# Patient Record
Sex: FEMALE | Race: WHITE | Employment: FULL TIME | URBAN - METROPOLITAN AREA
[De-identification: names, ages, dates, MRNs, and addresses within clinical notes are randomized per-mention and may not be internally consistent; named-entity substitution may affect disease eponyms.]

---

## 2022-10-14 ENCOUNTER — OFFICE VISIT (OUTPATIENT)
Dept: FAMILY MEDICINE | Facility: CLINIC | Age: 52
End: 2022-10-14

## 2022-10-14 VITALS
WEIGHT: 163 LBS | DIASTOLIC BLOOD PRESSURE: 78 MMHG | SYSTOLIC BLOOD PRESSURE: 118 MMHG | HEIGHT: 62 IN | TEMPERATURE: 98.1 F | HEART RATE: 90 BPM | BODY MASS INDEX: 30 KG/M2 | OXYGEN SATURATION: 98 %

## 2022-10-14 DIAGNOSIS — Z01.818 PREOP GENERAL PHYSICAL EXAM: Primary | ICD-10-CM

## 2022-10-14 DIAGNOSIS — E66.811 OBESITY (BMI 30.0-34.9): ICD-10-CM

## 2022-10-14 DIAGNOSIS — D64.9 LOW HEMOGLOBIN: ICD-10-CM

## 2022-10-14 DIAGNOSIS — M17.11 PRIMARY OSTEOARTHRITIS OF RIGHT KNEE: ICD-10-CM

## 2022-10-14 DIAGNOSIS — D50.9 IRON DEFICIENCY ANEMIA, UNSPECIFIED IRON DEFICIENCY ANEMIA TYPE: ICD-10-CM

## 2022-10-14 DIAGNOSIS — Z83.49 FAMILY HISTORY OF THYROID DISEASE: ICD-10-CM

## 2022-10-14 PROBLEM — Z76.89 HEALTH CARE HOME: Status: ACTIVE | Noted: 2022-10-14

## 2022-10-14 PROBLEM — Z71.89 ACP (ADVANCE CARE PLANNING): Status: ACTIVE | Noted: 2022-10-14

## 2022-10-14 LAB
ERYTHROCYTE [DISTWIDTH] IN BLOOD BY AUTOMATED COUNT: 16.3 %
HBA1C MFR BLD: 5.5 % (ref 4–7)
HCT VFR BLD AUTO: 31.8 % (ref 35–47)
HEMOGLOBIN: 10.3 G/DL (ref 11.7–15.7)
MCH RBC QN AUTO: 24.5 PG (ref 26–33)
MCHC RBC AUTO-ENTMCNC: 32.4 G/DL (ref 31–36)
MCV RBC AUTO: 75.6 FL (ref 78–100)
PLATELET COUNT - QUEST: 312 10^9/L (ref 150–375)
RBC # BLD AUTO: 4.21 10*12/L (ref 3.8–5.2)
WBC # BLD AUTO: 11 10*9/L (ref 4–11)

## 2022-10-14 PROCEDURE — 83036 HEMOGLOBIN GLYCOSYLATED A1C: CPT | Performed by: PHYSICIAN ASSISTANT

## 2022-10-14 PROCEDURE — 84443 ASSAY THYROID STIM HORMONE: CPT | Mod: 90 | Performed by: PHYSICIAN ASSISTANT

## 2022-10-14 PROCEDURE — 99203 OFFICE O/P NEW LOW 30 MIN: CPT | Performed by: PHYSICIAN ASSISTANT

## 2022-10-14 PROCEDURE — 85027 COMPLETE CBC AUTOMATED: CPT | Performed by: PHYSICIAN ASSISTANT

## 2022-10-14 PROCEDURE — 83540 ASSAY OF IRON: CPT | Mod: 90 | Performed by: PHYSICIAN ASSISTANT

## 2022-10-14 PROCEDURE — 82728 ASSAY OF FERRITIN: CPT | Mod: 90 | Performed by: PHYSICIAN ASSISTANT

## 2022-10-14 PROCEDURE — 36415 COLL VENOUS BLD VENIPUNCTURE: CPT | Performed by: PHYSICIAN ASSISTANT

## 2022-10-14 PROCEDURE — 83550 IRON BINDING TEST: CPT | Mod: 90 | Performed by: PHYSICIAN ASSISTANT

## 2022-10-14 RX ORDER — IBUPROFEN 400 MG/1
400 TABLET, FILM COATED ORAL EVERY 6 HOURS PRN
COMMUNITY

## 2022-10-14 NOTE — NURSING NOTE
Chief Complaint   Patient presents with     Pre-Op Exam     Right knee surgery 11/7 at [facility]     Establish Care         Pre-visit Screening:  Immunizations:  up to date  Colonoscopy:  NA  Mammogram: NA  Asthma Action Test/Plan:  NA  PHQ9:  NA  GAD7:  NA  Questioned patient about current smoking habits Pt. has never smoked.  Ok to leave detailed message on voice mail for today's visit only Yes, phone # [phone]

## 2022-10-14 NOTE — PROGRESS NOTES
[facility]  [address] [place] [address]  [address]  [facility] [address]  Phone: [phone]  Fax: [phone]  Primary Provider: Mary Herman  Pre-op Performing Provider: MARY HEMRAN      PREOPERATIVE EVALUATION:  Today's date: 10/14/2022    Howard Holder is a 51 year old female who presents for a preoperative evaluation.    Surgical Information:  Surgery/Procedure: right knee surgery  Surgery Location: [facility]  Surgeon: Dr. Murillo  Surgery Date: 11/7/2022  Time of Surgery: TBD  Where patient plans to recover: Other: overnight stay [facility]  Fax number for surgical facility: [phone]    Type of Anesthesia Anticipated: to be determined    Assessment & Plan     The proposed surgical procedure is considered INTERMEDIATE risk.    1. Preop general physical exam    2. Primary osteoarthritis of right knee    3. Obesity (BMI 30.0-34.9)    4. Family history of thyroid disease    5. Low hemoglobin    6. Iron deficiency anemia, unspecified iron deficiency anemia type             Risks and Recommendations:  The patient has the following additional risks and recommendations for perioperative complications:  Anemia/Bleeding/Clotting:    - Anemia and does not require treatment prior to surgery. Monitor hemoglobin postoperatively    Medication Instructions:  Patient is on no chronic medications    RECOMMENDATION:  APPROVAL GIVEN to proceed with proposed procedure, without further diagnostic evaluation.        Pt should RTC after surgery to evaluate cause of iron deficiency anemia. Has been advised to start OTC iron supplementation.          Subjective     HPI related to upcoming procedure: 2 years ago injury to the right knee    1. No - Have you ever had a heart attack or stroke?  2. No - Have you ever had surgery on your heart or blood vessels, such as a stent, coronary (heart) bypass, or surgery on an artery in the head, neck, heart, or legs?  3. No - Do you have chest  pain when you are physically active?  4. No - Do you have a history of heart failure?  5. No - Do you currently have a cold, bronchitis, or symptoms of other respiratory (head and chest) infections?  6. No - Do you have a cough, shortness of breath, or wheezing?  7. No - Do you or anyone in your family have a history of blood clots?  8. No - Do you or anyone in your family have a serious bleeding problem, such as long-lasting bleeding after surgeries or cuts?  9.YES - Have you ever had anemia or been told to take iron pills? With pregnancy  10. No - Have you had any abnormal blood loss such as black, tarry or bloody stools, or abnormal vaginal bleeding?  11. No - Have you ever had a blood transfusion?  12. Yes - Are you willing to have a blood transfusion if it is medically needed before, during, or after your surgery?  13. No - Have you or anyone in your family ever had problems with anesthesia (sedation for surgery)?  14. No - Do you have sleep apnea, excessive snoring, or daytime drowsiness?   15. No - Do you have any artifical heart valves or other implanted medical devices, such as a pacemaker, defibrillator, or continuous glucose monitor?  16. No - Do you have any artifical joints?  17. No - Are you allergic to latex?  18. No - Is there any chance that you may be pregnant?      Health Care Directive:  Patient does not have a Health Care Directive or Living Will: Discussed advance care planning with patient; information given to patient to review.    Preoperative Review of :   reviewed - no record of controlled substances prescribed.      Status of Chronic Conditions:  See problem list for active medical problems.  Problems all longstanding and stable, except as noted/documented.  See ROS for pertinent symptoms related to these conditions.      Review of Systems  Constitutional, neuro, ENT, endocrine, pulmonary, cardiac, gastrointestinal, genitourinary, musculoskeletal, integument and psychiatric systems  "are negative, except as otherwise noted.    Patient Active Problem List    Diagnosis Date Noted     Health Care Home 10/14/2022     Priority: Low     ACP (advance care planning) 10/14/2022     Priority: Low      Past Medical History:   Diagnosis Date     Allergic state      Past Surgical History:   Procedure Laterality Date      SECTION      , ,      Current Outpatient Medications   Medication Sig Dispense Refill     ibuprofen (ADVIL/MOTRIN) 400 MG tablet Take 400 mg by mouth every 6 hours as needed for moderate pain         No Known Allergies     Social History     Tobacco Use     Smoking status: Never     Smokeless tobacco: Never   Substance Use Topics     Alcohol use: Not Currently     Comment: rare     Family History   Problem Relation Age of Onset     Hyperlipidemia Mother      Other - See Comments Mother      Carotid Endarterectomy Mother      Stomach Cancer Father 55     Thyroid Disease Sister      History   Drug Use Not on file         Objective     /78 (BP Location: Left arm, Patient Position: Sitting, Cuff Size: Adult Large)   Pulse 90   Temp 98.1  F (36.7  C) (Temporal)   Ht 1.562 m (5' 1.5\")   Wt 73.9 kg (163 lb)   SpO2 98%   BMI 30.30 kg/m      Physical Exam    GENERAL APPEARANCE: healthy, alert and no distress     EYES: EOMI, PERRL     HENT: ear canals and TM's normal and nose and mouth without ulcers or lesions     NECK: no adenopathy, no asymmetry, masses, or scars and thyroid normal to palpation     RESP: lungs clear to auscultation - no rales, rhonchi or wheezes     CV: regular rates and rhythm, normal S1 S2, no S3 or S4 and no murmur, click or rub     ABDOMEN:  soft, nontender, no HSM or masses and bowel sounds normal     MS: extremities normal- no gross deformities noted, no evidence of inflammation in joints, FROM in all extremities.     SKIN: no suspicious lesions or rashes     NEURO: Normal strength and tone, sensory exam grossly normal, mentation intact and " speech normal     PSYCH: mentation appears normal. and affect normal/bright     LYMPHATICS: No cervical adenopathy    No results for input(s): HGB, PLT, INR, NA, POTASSIUM, CR, A1C in the last 71794 hours.     Diagnostics:    Component      Latest Ref Rng & Units 10/14/2022   WBC      4.0 - 11 10*9/L 11.0   RBC Count      3.8 - 5.2 10*12/L 4.21   Hemoglobin      11.7 - 15.7 g/dL 10.3 (A)   Hematocrit      35.0 - 47.0 % 31.8 (A)   MCV      78 - 100 fL 75.6 (A)   MCH      26 - 33 pg 24.5 (A)   MCHC      31 - 36 g/dL 32.4   RDW      % 16.3   Platelet Count      150 - 375 10:9/L 312   Iron      45 - 160 mcg/dL 24 (L)   TIBC      250 - 450 mcg/dL (calc) 417   % Saturation      16 - 45 % (calc) 6 (L)   TSH      mIU/L 3.57   Hemoglobin A1C      4.0 - 7.0 % 5.5   Ferritin      16 - 232 ng/mL 2 (L)       No EKG required, no history of coronary heart disease, significant arrhythmia, peripheral arterial disease or other structural heart disease.    Revised Cardiac Risk Index (RCRI):  The patient has the following serious cardiovascular risks for perioperative complications:   - No serious cardiac risks = 0 points     RCRI Interpretation: 0 points: Class I (very low risk - 0.4% complication rate)           Signed Electronically by: JENISE Carlton  Copy of this evaluation report is provided to requesting physician.

## 2022-10-15 LAB
% SATURATION - QUEST: 6 % (CALC) (ref 16–45)
FERRITIN SERPL-MCNC: 2 NG/ML (ref 16–232)
IRON: 24 MCG/DL (ref 45–160)
TIBC - QUEST: 417 MCG/DL (CALC) (ref 250–450)
TSH SERPL-ACNC: 3.57 MIU/L

## 2022-10-25 ENCOUNTER — TELEPHONE (OUTPATIENT)
Dept: FAMILY MEDICINE | Facility: CLINIC | Age: 52
End: 2022-10-25

## 2022-10-25 NOTE — TELEPHONE ENCOUNTER
Maikin K Farrington called the clinic support line with the following:    States that her Ortho wanted her to have her HGB tested again to see if it is improving. Advised that she would need an order.     She will call over to have them fax it. Can call her when we get order     [phone]

## 2022-11-02 DIAGNOSIS — D50.9 IRON DEFICIENCY ANEMIA, UNSPECIFIED IRON DEFICIENCY ANEMIA TYPE: Primary | ICD-10-CM

## 2022-11-02 LAB
% GRANULOCYTES: 71.6 %
HCT VFR BLD AUTO: 37.2 % (ref 35–47)
HEMOGLOBIN: 11.9 G/DL (ref 11.7–15.7)
LYMPHOCYTES NFR BLD AUTO: 22.4 %
MCH RBC QN AUTO: 25.2 PG (ref 26–33)
MCHC RBC AUTO-ENTMCNC: 32 G/DL (ref 31–36)
MCV RBC AUTO: 78.6 FL (ref 78–100)
MONOCYTES NFR BLD AUTO: 6 %
PLATELET COUNT - QUEST: 318 10^9/L (ref 150–375)
RBC # BLD AUTO: 4.73 10*12/L (ref 3.8–5.2)
WBC # BLD AUTO: 9.9 10*9/L (ref 4–11)

## 2022-11-02 PROCEDURE — 36415 COLL VENOUS BLD VENIPUNCTURE: CPT | Performed by: PHYSICIAN ASSISTANT

## 2022-11-02 PROCEDURE — 85025 COMPLETE CBC W/AUTO DIFF WBC: CPT | Performed by: PHYSICIAN ASSISTANT

## 2022-11-20 ENCOUNTER — HEALTH MAINTENANCE LETTER (OUTPATIENT)
Age: 52
End: 2022-11-20

## 2023-07-20 ENCOUNTER — OFFICE VISIT (OUTPATIENT)
Dept: FAMILY MEDICINE | Facility: CLINIC | Age: 53
End: 2023-07-20

## 2023-07-20 VITALS
TEMPERATURE: 98.2 F | WEIGHT: 161 LBS | OXYGEN SATURATION: 99 % | HEART RATE: 99 BPM | HEIGHT: 62 IN | SYSTOLIC BLOOD PRESSURE: 118 MMHG | DIASTOLIC BLOOD PRESSURE: 68 MMHG | BODY MASS INDEX: 29.63 KG/M2

## 2023-07-20 DIAGNOSIS — Z86.2 HISTORY OF IRON DEFICIENCY ANEMIA: ICD-10-CM

## 2023-07-20 DIAGNOSIS — Z12.11 SCREENING FOR COLON CANCER: ICD-10-CM

## 2023-07-20 DIAGNOSIS — Z11.51 SCREENING FOR HUMAN PAPILLOMAVIRUS: ICD-10-CM

## 2023-07-20 DIAGNOSIS — Z12.31 ENCOUNTER FOR SCREENING MAMMOGRAM FOR BREAST CANCER: ICD-10-CM

## 2023-07-20 DIAGNOSIS — Z01.419 ENCOUNTER FOR GYNECOLOGICAL EXAMINATION WITHOUT ABNORMAL FINDING: Primary | ICD-10-CM

## 2023-07-20 DIAGNOSIS — Z13.228 SCREENING FOR METABOLIC DISORDER: ICD-10-CM

## 2023-07-20 DIAGNOSIS — Z13.220 SCREENING FOR LIPID DISORDERS: ICD-10-CM

## 2023-07-20 PROCEDURE — 88142 CYTOPATH C/V THIN LAYER: CPT | Mod: 90 | Performed by: PHYSICIAN ASSISTANT

## 2023-07-20 PROCEDURE — 87624 HPV HI-RISK TYP POOLED RSLT: CPT | Mod: 90 | Performed by: PHYSICIAN ASSISTANT

## 2023-07-20 PROCEDURE — 99396 PREV VISIT EST AGE 40-64: CPT | Performed by: PHYSICIAN ASSISTANT

## 2023-07-20 RX ORDER — CETIRIZINE HYDROCHLORIDE 10 MG/1
10 TABLET ORAL DAILY
COMMUNITY

## 2023-07-20 NOTE — NURSING NOTE
Chief Complaint   Patient presents with     Physical     Fasting cpx         Pre-visit Screening:  Immunizations:  up to date  Colonoscopy:  is due and ordered today- prefers Cologuard  Mammogram: is due and ordered today  Asthma Action Test/Plan:  NA  PHQ9:  NA phq2 done  GAD7:  NA  Questioned patient about current smoking habits Pt. has never smoked.  Ok to leave detailed message on voice mail for today's visit only Yes, phone # [phone]

## 2023-07-20 NOTE — PROGRESS NOTES
Chief Complaint:  Physical Exam    SUBJECTIVE:   Howard Holder is a 52 year old female presents for routine health maintenance.    Current concerns: None. Fasting labs     Menses are regular    Patient's last menstrual period was 07/05/2023.    Was last Pap smear normal: Yes  Due for mammogram:  Yes. Needs first mammo    Body mass index is 29.93 kg/m .    Present exercise habits:  3-5 times/week  Present dietary habits:  eats regular meals and follows a balanced nutrition diet    Calcium intake: 1-2 servings daily.    Vit D intake: is not taking supplement    Is the patient a smoker? No  If yes, smoking cessation advised and counseling provided.     Cardiovascular risk factors: none    Over the past few weeks, have you felt down or depressed? Little interest or pleasure in doing things? No concerns    If in a relationship are there any Domestic violence concern: No    Last dental appointment:  last year  Last optical appointment:  this year    Was the patient born between [birth year] and has not had Hep C testing?  Has not been tested, declines testing    I have reviewed the following histories: Past Medical History, Past Surgical History, Social History, Family History, Problem List, Medication List and Allergies    Past Medical History:   Diagnosis Date     Allergic state      Family History   Problem Relation Age of Onset     Hyperlipidemia Mother      Other - See Comments Mother      Carotid Endarterectomy Mother      Stomach Cancer Father 55     Thyroid Disease Sister      Colon Cancer No family hx of      Social History     Socioeconomic History     Marital status:      Spouse name: Not on file     Number of children: Not on file     Years of education: Not on file     Highest education level: Not on file   Occupational History     Not on file   Tobacco Use     Smoking status: Never     Passive exposure: Past     Smokeless tobacco: Never   Substance and Sexual Activity     Alcohol use: Not Currently  "    Comment: rare- 2 drinks per year     Drug use: Never     Sexual activity: Yes     Partners: Male     Comment:    Other Topics Concern     Not on file   Social History Narrative     Not on file     Social Determinants of Health     Financial Resource Strain: Not on file   Food Insecurity: Not on file   Transportation Needs: Not on file   Physical Activity: Not on file   Stress: Not on file   Social Connections: Not on file   Intimate Partner Violence: Not on file   Housing Stability: Not on file     Past Surgical History:   Procedure Laterality Date      SECTION      , ,      REPLACEMENT TOTAL KNEE Right 2022    TCO     TUBAL LIGATION         ROS:  E/M: NEGATIVE for ear, nose, mouth and throat problems  R: NEGATIVE for significant/chronic cough or SOB  CV: NEGATIVE for chest pain or palpitations  GI: NEGATIVE for abdominal pain, chronic diarrhea or constipation  :  NEGATIVE for dysuria, hematuria or vaginal discharge. No sexual health concerns.       Current Outpatient Medications   Medication     cetirizine (ZYRTEC) 10 MG tablet     ibuprofen (ADVIL/MOTRIN) 400 MG tablet     No current facility-administered medications for this visit.       Patient Active Problem List    Diagnosis Date Noted     Health Care Home 10/14/2022     Priority: Low     ACP (advance care planning) 10/14/2022     Priority: Low         OBJECTIVE:  /68 (BP Location: Left arm, Patient Position: Sitting, Cuff Size: Adult Large)   Pulse 99   Temp 98.2  F (36.8  C) (Temporal)   Ht 1.562 m (5' 1.5\")   Wt 73 kg (161 lb)   LMP 2023   SpO2 99%   BMI 29.93 kg/m      General: 52 year old female who appears her stated age. Vital signs noted.  Head: Normocephalic  Eyes: pupils equal round reactive to light and accomodation, extra ocular movements intact  Ears: external canals and TMs free of abnormalities  Nose: patent, without mucosal abnormalities  Mouth and throat: without erythema or lesions of " "the mucosa  Neck: supple, without adenopathy or thyromegaly  Lungs: clear to auscultation, no wheezing or crackles  Breasts: skin without rash, no dominant mass, no nipple discharge, or axillary adenopathy  Cv: regular rate and rhythm, normal s1 and s2 without murmur or click  Abd: soft, non-tender, no masses, no hepatomegaly or splenomegaly.   (female): normal female external genitalia, normal urethral meatus, vaginal mucosa, normal cervix/adnexa/uterus without masses or discharge  Ms: normal muscle tone & symmetry  Skin: clear to inspection and with no palpable abnormalities.  Neuro: sensation and motor function grossly intact; cranial nerves without obvious abnormalities.    ASSESSMENT/PLAN:    Encounter for gynecological examination without abnormal finding  Howard is doing well today. Not fasting, but agrees to return in 2 weeks for lab only. Will also check iron panel at that time given her history. Updating Pap today. Ordered cologuard and first mammo.     Screening for metabolic disorde  - VENOUS COLLECTION; Standing  - Comprehensive Metobolic Panel ([facility]); Standing    Screening for lipid disorders  - VENOUS COLLECTION; Standing  - Lipid Panel ([facility]); Standing    History of iron deficiency anemia  - Hemogram Platelet ([facility]); Standing  - FERRITIN ([facility]); Standing  - IRON AND IRON BINDING CAPACITY ([facility]); Standing    Screening for human papillomavirus  - ThinPrep Pap and HPV (mRNa E6/E7) ([facility])    Screening for colon cancer  Offered home stool test called Cologuard. You may want to check with your insurance to see if this is covered, otherwise, let me know and I will order it. If the Cologuard comes back negative/normal it is considered good for 3 years. If it comes back positive/abnormal, they recommend a colonoscopy. They estimate 13/100 people will have a positive test. Just to warn you, if you test positive and need a colonoscopy, we would have to order the colonoscopy as a \"diagnostic\" instead of a " "\"screening\" colonoscopy as they are doing it to determine why the Cologuard result was abnormal. This means the cost of the colonoscopy would be coming out of your insurance deductible instead of your preventative health that is typically covered, and would likely cost ~$2000. Pt would like to do this option.  - COLOGUARD([employer])    Encounter for screening mammogram for breast cancer  - MA Screening Bilateral w/ Julio; Future  - Radiology Referral; Future     reports that she has never smoked. She has been exposed to tobacco smoke. She has never used smokeless tobacco.    Estimated body mass index is 29.93 kg/m  as calculated from the following:    Height as of this encounter: 1.562 m (5' 1.5\").    Weight as of this encounter: 73 kg (161 lb).  Weight management plan: Discussed healthy diet and exercise guidelines    Labs pending:      Fasting glucose      Fasting lipids  Meds Suggested:      Vitamin D       Calcium  Tests Recommended:      Regular Dental Examinations        Eye exam        Mammogram yearly  Behavior Modifications:       Cardiovascular exercise 3 times per week--enough to get your Target Heart rate  Other recommendations:     BMI noted and discussed      Regular breast exam     Encouraged My Chart    Counseling Resources:  ATP IV Guidelines  Pooled Cohorts Equation Calculator  Breast Cancer Risk Calculator  FRAX Risk Assessment  ICSI Preventive Guidelines  Dietary Guidelines for Americans, 2010  [employer]'s MyPlate            Libia Whalen PA-C  7/20/2023    "

## 2023-07-24 LAB
CLINICAL HISTORY - QUEST: NORMAL
COMMENT - QUEST: NORMAL
CYTOTECHNOLOGIST - QUEST: NORMAL
DESCRIPTIVE DIAGNOSIS - QUEST: NORMAL
HPV MRNA E6/E7: NOT DETECTED
LAST PAP DX - QUEST: NORMAL
LMP - QUEST: NORMAL
PREV BX DX - QUEST: NORMAL
SOURCE: NORMAL
STATEMENT OF ADEQUACY - QUEST: NORMAL

## 2023-07-31 DIAGNOSIS — Z86.2 HISTORY OF IRON DEFICIENCY ANEMIA: ICD-10-CM

## 2023-07-31 DIAGNOSIS — Z13.220 SCREENING FOR LIPID DISORDERS: ICD-10-CM

## 2023-07-31 DIAGNOSIS — Z13.228 SCREENING FOR METABOLIC DISORDER: ICD-10-CM

## 2023-07-31 LAB
ALBUMIN SERPL-MCNC: 4.2 G/DL (ref 3.6–5.1)
ALBUMIN/GLOB SERPL: 1.2 {RATIO} (ref 1–2.5)
ALP SERPL-CCNC: 95 U/L (ref 33–130)
ALT 1742-6: 8 U/L (ref 0–32)
AST 1920-8: 11 U/L (ref 0–35)
BILIRUB SERPL-MCNC: 0.6 MG/DL (ref 0.2–1.2)
BUN SERPL-MCNC: 14 MG/DL (ref 7–25)
BUN/CREATININE RATIO: 17.3 (ref 6–32)
CALCIUM SERPL-MCNC: 9.2 MG/DL (ref 8.6–10.3)
CHLORIDE SERPLBLD-SCNC: 104.3 MMOL/L (ref 98–110)
CHOLEST SERPL-MCNC: 206 MG/DL (ref 0–199)
CHOLEST/HDLC SERPL: 3 {RATIO} (ref 0–5)
CO2 SERPL-SCNC: 26.3 MMOL/L (ref 20–32)
CREAT SERPL-MCNC: 0.81 MG/DL (ref 0.6–1.3)
ERYTHROCYTE [DISTWIDTH] IN BLOOD BY AUTOMATED COUNT: 15 %
GLOBULIN, CALCULATED - QUEST: 3.5 (ref 1.9–3.7)
GLUCOSE SERPL-MCNC: 91 MG/DL (ref 60–99)
HCT VFR BLD AUTO: 37.8 % (ref 35–47)
HDLC SERPL-MCNC: 62 MG/DL (ref 40–150)
HEMOGLOBIN: 11.8 G/DL (ref 11.7–15.7)
LDLC SERPL CALC-MCNC: 124 MG/DL (ref 0–130)
MCH RBC QN AUTO: 25.5 PG (ref 26–33)
MCHC RBC AUTO-ENTMCNC: 31.2 G/DL (ref 31–36)
MCV RBC AUTO: 81.6 FL (ref 78–100)
PLATELET COUNT - QUEST: 323 10^9/L (ref 150–375)
POTASSIUM SERPL-SCNC: 4.61 MMOL/L (ref 3.5–5.3)
PROT SERPL-MCNC: 7.7 G/DL (ref 6.1–8.1)
RBC # BLD AUTO: 4.63 10*12/L (ref 3.8–5.2)
SODIUM SERPL-SCNC: 140.1 MMOL/L (ref 135–146)
TRIGL SERPL-MCNC: 101 MG/DL (ref 0–149)
WBC # BLD AUTO: 11.7 10*9/L (ref 4–11)

## 2023-07-31 PROCEDURE — 83550 IRON BINDING TEST: CPT | Mod: 90 | Performed by: PHYSICIAN ASSISTANT

## 2023-07-31 PROCEDURE — 85027 COMPLETE CBC AUTOMATED: CPT | Performed by: PHYSICIAN ASSISTANT

## 2023-07-31 PROCEDURE — 36415 COLL VENOUS BLD VENIPUNCTURE: CPT | Performed by: PHYSICIAN ASSISTANT

## 2023-07-31 PROCEDURE — 80061 LIPID PANEL: CPT | Performed by: PHYSICIAN ASSISTANT

## 2023-07-31 PROCEDURE — 82728 ASSAY OF FERRITIN: CPT | Mod: 90 | Performed by: PHYSICIAN ASSISTANT

## 2023-07-31 PROCEDURE — 83540 ASSAY OF IRON: CPT | Mod: 90 | Performed by: PHYSICIAN ASSISTANT

## 2023-07-31 PROCEDURE — 80053 COMPREHEN METABOLIC PANEL: CPT | Performed by: PHYSICIAN ASSISTANT

## 2023-08-01 LAB
% SATURATION - QUEST: 14 % (CALC) (ref 16–45)
FERRITIN SERPL-MCNC: 7 NG/ML (ref 16–232)
IRON: 50 MCG/DL (ref 45–160)
TIBC - QUEST: 366 MCG/DL (CALC) (ref 250–450)

## 2023-08-11 LAB — MAMMOGRAM: NORMAL

## 2023-08-14 LAB — NONINV COLON CA DNA+OCC BLD SCRN STL QL: NEGATIVE

## 2024-06-17 PROBLEM — Z76.89 HEALTH CARE HOME: Status: RESOLVED | Noted: 2022-10-14 | Resolved: 2024-06-17

## 2024-08-25 ENCOUNTER — HEALTH MAINTENANCE LETTER (OUTPATIENT)
Age: 54
End: 2024-08-25